# Patient Record
Sex: MALE | Race: WHITE | HISPANIC OR LATINO | URBAN - METROPOLITAN AREA
[De-identification: names, ages, dates, MRNs, and addresses within clinical notes are randomized per-mention and may not be internally consistent; named-entity substitution may affect disease eponyms.]

---

## 2018-03-27 ENCOUNTER — IMPORTED ENCOUNTER (OUTPATIENT)
Dept: URBAN - METROPOLITAN AREA CLINIC 31 | Facility: CLINIC | Age: 58
End: 2018-03-27

## 2018-03-27 PROBLEM — H17.11: Noted: 2018-03-27

## 2018-03-27 PROBLEM — H25.813: Noted: 2018-03-27

## 2018-03-27 PROCEDURE — 92004 COMPRE OPH EXAM NEW PT 1/>: CPT

## 2018-03-27 PROCEDURE — 92015 DETERMINE REFRACTIVE STATE: CPT

## 2018-03-27 NOTE — PATIENT DISCUSSION
1.  Refractive error Annual Good ocular health documented. Discussed options of glasses contacts or refractive surgery. Discussed importance of annual eye exams. Change glasses. 2.  Combined Types of Cataract OU: Explained how cataracts can effect vision. Recommend clinical observation. The patient was advised to contact us if any change or worsening of vision. 3. 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 Explained potential for PKP when cat is sig.

## 2019-06-17 ENCOUNTER — IMPORTED ENCOUNTER (OUTPATIENT)
Dept: URBAN - METROPOLITAN AREA CLINIC 31 | Facility: CLINIC | Age: 59
End: 2019-06-17

## 2019-06-17 PROBLEM — H25.13: Noted: 2019-06-17

## 2019-06-17 PROCEDURE — 92015 DETERMINE REFRACTIVE STATE: CPT

## 2019-06-17 PROCEDURE — 92014 COMPRE OPH EXAM EST PT 1/>: CPT

## 2019-06-25 NOTE — PATIENT DISCUSSION
MACULAR HOLE, OS:  REFER TO RETINAL SPECIALIST, DR. Victor Manuel Grimes FOR EVALUATION AND TREATMENT. RETURN FOR FOLLOW-UP AS SCHEDULED.

## 2019-07-30 NOTE — PATIENT DISCUSSION
RETINAL HOLE, OS: CHRONIC PIGMENTED ATROPHIC HOLE. NO TREATMENT INDICATED AT THIS TIME. STABLE. RETURN FOR FOLLOW UP AS SCHEDULED.

## 2019-07-30 NOTE — PATIENT DISCUSSION
RETINA IS ATTACHED OU: PVD OU; CHRONIC PIGMENTED ROUND HOLE OD; NO HOLES OR TEARS SEEN ON DILATED EXAM OS.  RETINAL DETACHMENT SIGNS AND SYMPTOMS REVIEWED

## 2020-02-11 NOTE — PATIENT DISCUSSION
EPIMACULAR MEMBRANE, OS: VISUALLY SIGNIFICANT. DISCUSSED SURGICAL OPTIONS - PATIENT NOT INTERESTED IN SURGERY AT THIS TIME. RETURN AS SCHEDULED FOR OCT / EVALUATION.

## 2020-02-11 NOTE — PATIENT DISCUSSION
CATARACT OU: BECOMING VISUALLY SIGNIFICANT. CLEARED FOR PHACO OU WHEN PATIENT IS READY. RETURN TO DR PERKINS AS SCHEDULED.

## 2020-02-11 NOTE — PATIENT DISCUSSION
RETINA IS ATTACHED OU: PVD OU; CHRONIC PIGMENTED ROUND HOLE STABLE OS; NO HOLES OR TEARS SEEN ON DILATED EXAM OS.  RETINAL DETACHMENT SIGNS AND SYMPTOMS REVIEWED

## 2020-08-25 NOTE — PATIENT DISCUSSION
MYASTHENIA GRAVIS:  DEFER PPV SURGERY UNTIL MYASTHENIA HAS BEEN TREATED. PATIENT IS SCHEDULED FOR CONSULT WITH DR Meghna Franco ([facility]).

## 2021-02-23 NOTE — PATIENT DISCUSSION
MYASTHENIA GRAVIS:  DOING VERY WELL WITH SYSTEMIC IMMUNOSUPPRESSION. RETURN AS SCHEDULED TO DR DAVILA ([facility]).

## 2021-08-11 NOTE — PATIENT DISCUSSION
2 WEEK POST-OP OD - Doing well. Continue to taper off of Pred Forte drops as directed. Rx was given for glasses and/or OTC readers recommended. Monitor with annual exams.

## 2021-11-03 ENCOUNTER — IMPORTED ENCOUNTER (OUTPATIENT)
Dept: URBAN - METROPOLITAN AREA CLINIC 31 | Facility: CLINIC | Age: 61
End: 2021-11-03

## 2021-11-03 PROBLEM — E11.9: Noted: 2021-11-03

## 2021-11-03 PROBLEM — H25.13: Noted: 2021-11-03

## 2021-11-03 PROCEDURE — 92014 COMPRE OPH EXAM EST PT 1/>: CPT

## 2021-11-03 NOTE — PATIENT DISCUSSION
1.  Refractive error Annual Good ocular health documented. Discussed options of glasses contacts or refractive surgery. Discussed importance of annual eye exams. 2.  Nuclear Sclerotic Cataract OU: Explained how cataracts can effect vision. Recommend clinical observation. The patient was advised to contact us if any change or worsening of vision. 3.  DM II without sign of Diabetic Retinopathy OU:  Discussed the pathophysiology of diabetes and its effect on the eye. Stressed the importance of regular followup and good control of BS BP and Lipids to avoid future complications. Optional Rx

## 2022-04-01 ASSESSMENT — VISUAL ACUITY
OD_CC: 20/70+1
OD_CC: 20/100-1
OS_PH: SC 20/25
OS_PH: SC 20/25
OD_PH: SC 20/40 +2
OS_PH: SC 20/25
OD_PH: SC 20/50
OS_CC: 20/40-3
OS_CC: 20/40-2
OS_CC: 20/40
OD_PH: SC 20/40
OD_CC: 20/70-2

## 2022-04-01 ASSESSMENT — TONOMETRY
OD_IOP_MMHG: 13
OD_IOP_MMHG: 17
OS_IOP_MMHG: 16
OS_IOP_MMHG: 13
OS_IOP_MMHG: 18
OD_IOP_MMHG: 14

## 2022-07-09 ENCOUNTER — TELEPHONE ENCOUNTER (OUTPATIENT)
Dept: URBAN - METROPOLITAN AREA CLINIC 121 | Facility: CLINIC | Age: 62
End: 2022-07-09

## 2022-07-10 ENCOUNTER — TELEPHONE ENCOUNTER (OUTPATIENT)
Dept: URBAN - METROPOLITAN AREA CLINIC 121 | Facility: CLINIC | Age: 62
End: 2022-07-10

## 2022-07-30 ENCOUNTER — TELEPHONE ENCOUNTER (OUTPATIENT)
Age: 62
End: 2022-07-30

## 2022-07-31 ENCOUNTER — TELEPHONE ENCOUNTER (OUTPATIENT)
Age: 62
End: 2022-07-31

## 2022-11-15 NOTE — PATIENT DISCUSSION
After discussion of risks, benefits, and alternatives of surgery, including infection, bleeding, loss of vision, need for additional surgeries and/or retinal detachment, patient elects observation for now.

## 2022-12-06 ENCOUNTER — ESTABLISHED PATIENT (OUTPATIENT)
Dept: URBAN - METROPOLITAN AREA CLINIC 31 | Facility: CLINIC | Age: 62
End: 2022-12-06

## 2022-12-06 PROCEDURE — 92014 COMPRE OPH EXAM EST PT 1/>: CPT

## 2022-12-06 PROCEDURE — 92015 DETERMINE REFRACTIVE STATE: CPT

## 2022-12-06 ASSESSMENT — TONOMETRY
OD_IOP_MMHG: 13
OS_IOP_MMHG: 13

## 2022-12-06 ASSESSMENT — VISUAL ACUITY
OD_PH: 20/40-2
OD_SC: J2
OS_PH: 20/30
OS_SC: J2
OU_SC: J2
OD_SC: 20/50+2
OS_SC: 20/40+1

## 2022-12-06 NOTE — PATIENT DISCUSSION
1.  Refractive error Annual Good ocular health documented. Discussed options of glasses contacts or refractive surgery. Discussed importance of annual eye exams. 2.  Nuclear Sclerotic Cataract OU: Explained how cataracts can effect vision. Recommend clinical observation. The patient was advised to contact us if any change or worsening of vision. 3.  DM II without sign of Diabetic Retinopathy OU:  Discussed the pathophysiology of diabetes and its effect on the eye. Stressed the importance of regular followup and good control of BS BP and Lipids to avoid future complications. Optional Rx.

## 2023-12-05 ENCOUNTER — COMPREHENSIVE EXAM (OUTPATIENT)
Dept: URBAN - METROPOLITAN AREA CLINIC 31 | Facility: CLINIC | Age: 63
End: 2023-12-05

## 2023-12-05 DIAGNOSIS — E11.9: ICD-10-CM

## 2023-12-05 DIAGNOSIS — H52.4: ICD-10-CM

## 2023-12-05 PROCEDURE — 92014 COMPRE OPH EXAM EST PT 1/>: CPT

## 2023-12-05 PROCEDURE — 92015 DETERMINE REFRACTIVE STATE: CPT

## 2023-12-05 ASSESSMENT — TONOMETRY
OS_IOP_MMHG: 15
OD_IOP_MMHG: 14

## 2023-12-05 ASSESSMENT — VISUAL ACUITY
OS_PH: 20/30
OS_SC: 20/40
OD_SC: 20/50-2

## 2024-11-12 ENCOUNTER — COMPREHENSIVE EXAM (OUTPATIENT)
Dept: URBAN - METROPOLITAN AREA CLINIC 31 | Facility: CLINIC | Age: 64
End: 2024-11-12

## 2024-11-12 DIAGNOSIS — E11.9: ICD-10-CM

## 2024-11-12 DIAGNOSIS — H52.4: ICD-10-CM

## 2024-11-12 PROCEDURE — 92250 FUNDUS PHOTOGRAPHY W/I&R: CPT

## 2024-11-12 PROCEDURE — 92014 COMPRE OPH EXAM EST PT 1/>: CPT

## 2024-11-12 PROCEDURE — 92015 DETERMINE REFRACTIVE STATE: CPT

## 2024-12-05 ENCOUNTER — PREPPED CHART (OUTPATIENT)
Age: 64
End: 2024-12-05

## 2024-12-05 DIAGNOSIS — H52.4: ICD-10-CM

## 2024-12-05 DIAGNOSIS — H25.13: ICD-10-CM

## 2024-12-05 DIAGNOSIS — E11.9: ICD-10-CM

## 2024-12-05 PROCEDURE — 92015 DETERMINE REFRACTIVE STATE: CPT

## 2024-12-05 PROCEDURE — 92014 COMPRE OPH EXAM EST PT 1/>: CPT

## 2025-04-28 ENCOUNTER — LAB OUTSIDE AN ENCOUNTER (OUTPATIENT)
Dept: URBAN - METROPOLITAN AREA SURGERY CENTER 4 | Facility: SURGERY CENTER | Age: 65
End: 2025-04-28

## 2025-04-29 ENCOUNTER — OFFICE VISIT (OUTPATIENT)
Dept: URBAN - METROPOLITAN AREA SURGERY CENTER 4 | Facility: SURGERY CENTER | Age: 65
End: 2025-04-29

## 2025-04-30 ENCOUNTER — OFFICE VISIT (OUTPATIENT)
Dept: URBAN - METROPOLITAN AREA SURGERY CENTER 4 | Facility: SURGERY CENTER | Age: 65
End: 2025-04-30

## 2025-08-13 ENCOUNTER — OFFICE VISIT (OUTPATIENT)
Dept: URBAN - METROPOLITAN AREA SURGERY CENTER 4 | Facility: SURGERY CENTER | Age: 65
End: 2025-08-13